# Patient Record
(demographics unavailable — no encounter records)

---

## 2024-11-03 NOTE — ASSESSMENT
[FreeTextEntry1] : Patient is a 31-year-old female with a past medical history as above who presents for an annual wellness visit.

## 2024-11-03 NOTE — PLAN
[FreeTextEntry1] : Endocrinology history of thyroid nodule - check TSH Gynecology  Results of most recent pap smear to be requested from GYN  Family hx of breast cancer - results of most recent breast imaging (Breast US) to be requested Hematology history of iron deficiency - off Iron 325mg p.o.q.d. as directed - check CBC, Iron/TIBC, and Serum Ferritin Dermatology thinning hair - continue Minoxidil 2% solution as directed; continue Spironolactone 25mg p.o.q.d. - follow up with dermatologist Dr. Tuyet Van as needed  Immunization influenza vaccine - patient received the vaccine through work  S/p COVID-19 Vaccine (x3) - recommended following up at pharmacy for updated COVID-19 vaccine booster  check EKG (results as above), female panel, hemoglobin A1C, Iron/TIBC, Serum Ferritin

## 2024-11-03 NOTE — HISTORY OF PRESENT ILLNESS
[FreeTextEntry1] : annual wellness visit [de-identified] : Patient is a 31-year-old female with a past medical history as below who presents for an annual wellness visit. Patient is taking Junel FE daily as prescribed. She denies any adverse reactions or side effects on the oral contraceptive. She is also taking Spironolactone and Minoxadil for hair thinning/loss as prescribed by her dermatologist. Patient has seen GYN in [facility] in the past year for her annual visit. Patient denies any issues with vision. Patient denies any issues with hearing. Patient maintains a well-balanced diet and exercises regularly (gym, hot yoga). Patient receives the flu vaccine annually through work. She received TDAP 9/2022.  Patient is vaccinated against COVID-19 (3 doses). She received typhoid vaccine, HBV vaccine and MMR booster at travel clinic prior to recent  [place] as well as antimilarial medication.  Patient's mother had recently been diagnosed with Stage 1 breast cancer and had a double mastectomy done. Patient had subsequently had a mammogram and breast ultrasound done which noted a benign lesion and she was recommended to follow up for a repeat Breast.US in 6 months which was done and WNL..

## 2024-11-03 NOTE — PHYSICAL EXAM
[No Acute Distress] : no acute distress [Well Nourished] : well nourished [Well Developed] : well developed [Well-Appearing] : well-appearing [Normal Voice/Communication] : normal voice/communication [Normal Sclera/Conjunctiva] : normal sclera/conjunctiva [PERRL] : pupils equal round and reactive to light [EOMI] : extraocular movements intact [Normal Outer Ear/Nose] : the outer ears and nose were normal in appearance [Normal Oropharynx] : the oropharynx was normal [Normal TMs] : both tympanic membranes were normal [Normal Nasal Mucosa] : the nasal mucosa was normal [No JVD] : no jugular venous distention [No Lymphadenopathy] : no lymphadenopathy [Supple] : supple [Thyroid Normal, No Nodules] : the thyroid was normal and there were no nodules present [No Respiratory Distress] : no respiratory distress  [No Accessory Muscle Use] : no accessory muscle use [Clear to Auscultation] : lungs were clear to auscultation bilaterally [Normal Rate] : normal rate  [Regular Rhythm] : with a regular rhythm [Normal S1, S2] : normal S1 and S2 [No Murmur] : no murmur heard [No Carotid Bruits] : no carotid bruits [No Varicosities] : no varicosities [No Abdominal Bruit] : a ~M bruit was not heard ~T in the abdomen [Pedal Pulses Present] : the pedal pulses are present [No Edema] : there was no peripheral edema [No Palpable Aorta] : no palpable aorta [Soft] : abdomen soft [Non Tender] : non-tender [Non-distended] : non-distended [No HSM] : no HSM [No Masses] : no abdominal mass palpated [Normal Bowel Sounds] : normal bowel sounds [Normal Supraclavicular Nodes] : no supraclavicular lymphadenopathy [Normal Posterior Cervical Nodes] : no posterior cervical lymphadenopathy [Normal Anterior Cervical Nodes] : no anterior cervical lymphadenopathy [No CVA Tenderness] : no CVA  tenderness [No Spinal Tenderness] : no spinal tenderness [Kyphosis] : no kyphosis [Scoliosis] : no scoliosis [No Joint Swelling] : no joint swelling [Grossly Normal Strength/Tone] : grossly normal strength/tone [No Rash] : no rash [Acne] : no acne [Coordination Grossly Intact] : coordination grossly intact [No Focal Deficits] : no focal deficits [Normal Gait] : normal gait [Deep Tendon Reflexes (DTR)] : deep tendon reflexes were 2+ and symmetric [Speech Grossly Normal] : speech grossly normal [Memory Grossly Normal] : memory grossly normal [Normal Affect] : the affect was normal [Alert and Oriented x3] : oriented to person, place, and time [Normal Mood] : the mood was normal [Normal Insight/Judgement] : insight and judgment were intact [Normal] : affect was normal and insight and judgment were intact [de-identified] : done by GYN

## 2024-11-03 NOTE — HEALTH RISK ASSESSMENT
[Yes] : Yes [2 - 4 times a month (2 pts)] : 2-4 times a month (2 points) [3 or 4 (1 pt)] : 3 or 4  (1 point) [Less than monthly (1 pt)] : Less than monthly (1 point) [No] : In the past 12 months have you used drugs other than those required for medical reasons? No [0] : 2) Feeling down, depressed, or hopeless: Not at all (0) [PHQ-2 Negative - No further assessment needed] : PHQ-2 Negative - No further assessment needed [Audit-CScore] : 4 [de-identified] : Gym, hot yoga. [de-identified] : Regular. [[id number]] : 0 [Never] : Never [HIV Test offered] : HIV Test offered [Hepatitis C test offered] : Hepatitis C test offered [PapSmearDate] : 2023  [PapSmearComments] : Results to be requested from Dr. Chante Terrazas's office.

## 2024-11-03 NOTE — ADDENDUM
[FreeTextEntry1] : I, Elisellen Paynesid, acted solely as scribe for Dr. Linwood Lee DO on this date 10/27/2023.  All medical record entries made by the Scribe were at my, Dr. Linwood eLe DO direction and personally dictated by me on 10/27/2023. I have reviewed the chart and agree that the record accurately reflects my personal performance of the history, physical exam, assessment and plan. I have also personally directed, reviewed and agreed with the chart.